# Patient Record
Sex: MALE | Race: ASIAN
[De-identification: names, ages, dates, MRNs, and addresses within clinical notes are randomized per-mention and may not be internally consistent; named-entity substitution may affect disease eponyms.]

---

## 2023-09-02 ENCOUNTER — HOSPITAL ENCOUNTER (EMERGENCY)
Dept: HOSPITAL 94 - ER | Age: 28
LOS: 1 days | Discharge: ANOTHER HOSPITAL | End: 2023-09-03
Payer: COMMERCIAL

## 2023-09-02 VITALS — HEIGHT: 69 IN | BODY MASS INDEX: 25.98 KG/M2 | WEIGHT: 175.38 LBS

## 2023-09-02 VITALS
DIASTOLIC BLOOD PRESSURE: 69 MMHG | OXYGEN SATURATION: 99 % | SYSTOLIC BLOOD PRESSURE: 138 MMHG | HEART RATE: 77 BPM | TEMPERATURE: 98.9 F | RESPIRATION RATE: 25 BRPM

## 2023-09-02 DIAGNOSIS — J45.909: ICD-10-CM

## 2023-09-02 DIAGNOSIS — S01.311A: ICD-10-CM
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
## 2023-09-02 DIAGNOSIS — R11.10: ICD-10-CM

## 2023-09-02 DIAGNOSIS — E87.6: ICD-10-CM

## 2023-09-02 DIAGNOSIS — Z88.2: ICD-10-CM
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
## 2023-09-02 DIAGNOSIS — S06.0X1A: Primary | ICD-10-CM
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
## 2023-09-02 DIAGNOSIS — F17.200: ICD-10-CM

## 2023-09-02 LAB
ALBUMIN SERPL BCP-MCNC: 4.5 G/DL (ref 3.4–5)
ALBUMIN/GLOB SERPL: 1.3 {RATIO} (ref 1.1–1.5)
ALP SERPL-CCNC: 70 IU/L (ref 46–116)
ALT SERPL W P-5'-P-CCNC: 21 U/L (ref 12–78)
ANION GAP SERPL CALCULATED.3IONS-SCNC: 10 MMOL/L (ref 8–16)
APTT PPP: 23 SECONDS (ref 22–32)
AST SERPL W P-5'-P-CCNC: 20 U/L (ref 10–37)
BASOPHILS # BLD AUTO: 0 X10'3 (ref 0–0.2)
BASOPHILS NFR BLD AUTO: 0.5 % (ref 0–1)
BILIRUB SERPL-MCNC: 0.4 MG/DL (ref 0.1–1)
BILIRUB UR QL STRIP: NEGATIVE
BUN SERPL-MCNC: 15 MG/DL (ref 7–18)
BUN/CREAT SERPL: 10.6 (ref 10–20)
CALCIUM SERPL-MCNC: 9.7 MG/DL (ref 8.5–10.1)
CHLORIDE SERPL-SCNC: 100 MMOL/L (ref 99–107)
CK MB SERPL-MCNC: 0.6 NG/ML (ref 0.3–3.6)
CK MB SERPL-RTO: 0.3 RATIO (ref 0–2.5)
CK SERPL-CCNC: 190 U/L (ref 39–308)
CLARITY UR: CLEAR
CO2 SERPL-SCNC: 27.7 MMOL/L (ref 24–32)
COLOR UR: YELLOW
CREAT CL PREDICTED SERPL C-G-VRATE: 77 ML/MIN
CREAT SERPL-MCNC: 1.41 MG/DL (ref 0.6–1.1)
EOSINOPHIL # BLD AUTO: 0.4 X10'3 (ref 0–0.9)
EOSINOPHIL NFR BLD AUTO: 4.4 % (ref 0–6)
ERYTHROCYTE [DISTWIDTH] IN BLOOD BY AUTOMATED COUNT: 12.9 % (ref 11.5–14.5)
ETHANOL SERPL-MCNC: < 10 MG/DL (ref ?–10)
GFR SERPL CREATININE-BSD FRML MDRD: 59 ML/MIN
GLOBULIN SER CALC-MCNC: 3.6 G/DL (ref 2.7–4.3)
GLUCOSE SERPL-MCNC: 149 MG/DL (ref 70–104)
GLUCOSE UR STRIP-MCNC: NEGATIVE MG/DL
HCT VFR BLD AUTO: 42.6 % (ref 42–52)
HGB BLD-MCNC: 14.5 G/DL (ref 14–17.9)
HGB UR QL STRIP: NEGATIVE
INR PPP: 1 INR
KETONES UR STRIP-MCNC: NEGATIVE MG/DL
LEUKOCYTE ESTERASE UR QL STRIP: NEGATIVE
LIPASE SERPL-CCNC: 93 U/L (ref 73–393)
LYMPHOCYTES # BLD AUTO: 3 X10'3 (ref 1.1–4.8)
LYMPHOCYTES NFR BLD AUTO: 30.2 % (ref 21–51)
MCH RBC QN AUTO: 31.6 PG (ref 27–31)
MCHC RBC AUTO-ENTMCNC: 34.1 G/DL (ref 33–36.5)
MCV RBC AUTO: 92.7 FL (ref 78–98)
MONOCYTES # BLD AUTO: 0.6 X10'3 (ref 0–0.9)
MONOCYTES NFR BLD AUTO: 6 % (ref 2–12)
MYOGLOBIN SERPL-MCNC: 270 NG/ML (ref 16–96)
NEUTROPHILS # BLD AUTO: 5.9 X10'3 (ref 1.8–7.7)
NEUTROPHILS NFR BLD AUTO: 58.9 % (ref 42–75)
NITRITE UR QL STRIP: NEGATIVE
PH UR STRIP: 7 [PH] (ref 4.8–8)
PLATELET # BLD AUTO: 246 X10'3 (ref 140–440)
PMV BLD AUTO: 8 FL (ref 7.4–10.4)
POTASSIUM SERPL-SCNC: 2.9 MMOL/L (ref 3.5–5.1)
PROT SERPL-MCNC: 8.1 G/DL (ref 6.4–8.2)
PROT UR QL STRIP: NEGATIVE MG/DL
PROTHROMBIN TIME: 11.1 SECONDS (ref 9–12)
RBC # BLD AUTO: 4.59 X10'6 (ref 4.7–6.1)
SODIUM SERPL-SCNC: 138 MMOL/L (ref 135–145)
SP GR UR STRIP: 1.01 (ref 1–1.03)
URN COLLECT METHOD CLASS: (no result)
UROBILINOGEN UR STRIP-MCNC: 0.2 E.U/DL (ref 0.2–1)
WBC # BLD AUTO: 10.1 X10'3 (ref 4.5–11)

## 2023-09-02 PROCEDURE — 70498 CT ANGIOGRAPHY NECK: CPT

## 2023-09-02 PROCEDURE — 70450 CT HEAD/BRAIN W/O DYE: CPT

## 2023-09-02 PROCEDURE — 70496 CT ANGIOGRAPHY HEAD: CPT

## 2023-09-02 PROCEDURE — 86901 BLOOD TYPING SEROLOGIC RH(D): CPT

## 2023-09-02 PROCEDURE — 85730 THROMBOPLASTIN TIME PARTIAL: CPT

## 2023-09-02 PROCEDURE — 93005 ELECTROCARDIOGRAM TRACING: CPT

## 2023-09-02 PROCEDURE — 12011 RPR F/E/E/N/L/M 2.5 CM/<: CPT

## 2023-09-02 PROCEDURE — 85025 COMPLETE CBC W/AUTO DIFF WBC: CPT

## 2023-09-02 PROCEDURE — 99291 CRITICAL CARE FIRST HOUR: CPT

## 2023-09-02 PROCEDURE — 82553 CREATINE MB FRACTION: CPT

## 2023-09-02 PROCEDURE — 70486 CT MAXILLOFACIAL W/O DYE: CPT

## 2023-09-02 PROCEDURE — 74176 CT ABD & PELVIS W/O CONTRAST: CPT

## 2023-09-02 PROCEDURE — 84484 ASSAY OF TROPONIN QUANT: CPT

## 2023-09-02 PROCEDURE — 71250 CT THORAX DX C-: CPT

## 2023-09-02 PROCEDURE — 80320 DRUG SCREEN QUANTALCOHOLS: CPT

## 2023-09-02 PROCEDURE — 72125 CT NECK SPINE W/O DYE: CPT

## 2023-09-02 PROCEDURE — 86900 BLOOD TYPING SEROLOGIC ABO: CPT

## 2023-09-02 PROCEDURE — 86885 COOMBS TEST INDIRECT QUAL: CPT

## 2023-09-02 PROCEDURE — 82550 ASSAY OF CK (CPK): CPT

## 2023-09-02 PROCEDURE — 96375 TX/PRO/DX INJ NEW DRUG ADDON: CPT

## 2023-09-02 PROCEDURE — 80053 COMPREHEN METABOLIC PANEL: CPT

## 2023-09-02 PROCEDURE — 83690 ASSAY OF LIPASE: CPT

## 2023-09-02 PROCEDURE — 96374 THER/PROPH/DIAG INJ IV PUSH: CPT

## 2023-09-02 PROCEDURE — 36415 COLL VENOUS BLD VENIPUNCTURE: CPT

## 2023-09-02 PROCEDURE — 85610 PROTHROMBIN TIME: CPT

## 2023-09-02 PROCEDURE — 83874 ASSAY OF MYOGLOBIN: CPT

## 2023-09-02 PROCEDURE — 81003 URINALYSIS AUTO W/O SCOPE: CPT

## 2023-09-02 NOTE — NUR
pt medicated with 0.5 mg iv ativan for active vomiting prior to transport to 
[facility] emergency depart. pt exited unit via gurney with ems crew.

## 2023-09-02 NOTE — NUR
pt back from ct c/o increased pain in left jaw provider notified waiting on 
medication order 133/89  [phone] [id number]

## 2023-09-02 NOTE — NUR
report given to Dave BURKS [facility] emergency depart. ems at bedside waiting for 
provider to complete suturing L ear laceration. report given to [facility] 
Paramedic meena 127.

## 2023-09-02 NOTE — NUR
pt to ct for facial bone/tissue scan. provider aware pt c/o dizziness when he 
changed position from laying to sitting declined to use urinal sitting he 
endorsed he was able to stand while voiding this rn sba while pt urinated via 
urinal after voiding pt sat down began to vomit approx 300ml fluid. provider 
notified no new orders at present time.